# Patient Record
Sex: MALE | Race: WHITE | Employment: FULL TIME | URBAN - METROPOLITAN AREA
[De-identification: names, ages, dates, MRNs, and addresses within clinical notes are randomized per-mention and may not be internally consistent; named-entity substitution may affect disease eponyms.]

---

## 2023-06-21 ENCOUNTER — OFFICE VISIT (OUTPATIENT)
Dept: PRIMARY CARE CLINIC | Age: 58
End: 2023-06-21
Payer: COMMERCIAL

## 2023-06-21 VITALS
DIASTOLIC BLOOD PRESSURE: 80 MMHG | HEIGHT: 70 IN | WEIGHT: 181.8 LBS | HEART RATE: 88 BPM | OXYGEN SATURATION: 98 % | BODY MASS INDEX: 26.03 KG/M2 | SYSTOLIC BLOOD PRESSURE: 120 MMHG

## 2023-06-21 DIAGNOSIS — Z13.6 ENCOUNTER FOR LIPID SCREENING FOR CARDIOVASCULAR DISEASE: ICD-10-CM

## 2023-06-21 DIAGNOSIS — Z12.11 SCREEN FOR COLON CANCER: ICD-10-CM

## 2023-06-21 DIAGNOSIS — R39.12 WEAK URINARY STREAM: ICD-10-CM

## 2023-06-21 DIAGNOSIS — Z13.220 ENCOUNTER FOR LIPID SCREENING FOR CARDIOVASCULAR DISEASE: ICD-10-CM

## 2023-06-21 DIAGNOSIS — R53.82 CHRONIC FATIGUE: ICD-10-CM

## 2023-06-21 DIAGNOSIS — Z00.00 ANNUAL PHYSICAL EXAM: Primary | ICD-10-CM

## 2023-06-21 DIAGNOSIS — Z12.5 SCREENING PSA (PROSTATE SPECIFIC ANTIGEN): ICD-10-CM

## 2023-06-21 PROCEDURE — 99386 PREV VISIT NEW AGE 40-64: CPT | Performed by: PHYSICIAN ASSISTANT

## 2023-06-21 SDOH — ECONOMIC STABILITY: FOOD INSECURITY: WITHIN THE PAST 12 MONTHS, YOU WORRIED THAT YOUR FOOD WOULD RUN OUT BEFORE YOU GOT MONEY TO BUY MORE.: NEVER TRUE

## 2023-06-21 SDOH — ECONOMIC STABILITY: INCOME INSECURITY: HOW HARD IS IT FOR YOU TO PAY FOR THE VERY BASICS LIKE FOOD, HOUSING, MEDICAL CARE, AND HEATING?: NOT HARD AT ALL

## 2023-06-21 SDOH — ECONOMIC STABILITY: FOOD INSECURITY: WITHIN THE PAST 12 MONTHS, THE FOOD YOU BOUGHT JUST DIDN'T LAST AND YOU DIDN'T HAVE MONEY TO GET MORE.: NEVER TRUE

## 2023-06-21 SDOH — ECONOMIC STABILITY: HOUSING INSECURITY
IN THE LAST 12 MONTHS, WAS THERE A TIME WHEN YOU DID NOT HAVE A STEADY PLACE TO SLEEP OR SLEPT IN A SHELTER (INCLUDING NOW)?: NO

## 2023-06-21 ASSESSMENT — ENCOUNTER SYMPTOMS
DIARRHEA: 0
RHINORRHEA: 0
BACK PAIN: 0
CONSTIPATION: 0
EYE PAIN: 0
ABDOMINAL PAIN: 0
VOMITING: 0
NAUSEA: 0
SHORTNESS OF BREATH: 0
COUGH: 0
SINUS PAIN: 0

## 2023-06-21 ASSESSMENT — PATIENT HEALTH QUESTIONNAIRE - PHQ9
SUM OF ALL RESPONSES TO PHQ QUESTIONS 1-9: 0
2. FEELING DOWN, DEPRESSED OR HOPELESS: 0
SUM OF ALL RESPONSES TO PHQ QUESTIONS 1-9: 0
SUM OF ALL RESPONSES TO PHQ QUESTIONS 1-9: 0
1. LITTLE INTEREST OR PLEASURE IN DOING THINGS: 0
SUM OF ALL RESPONSES TO PHQ QUESTIONS 1-9: 0
SUM OF ALL RESPONSES TO PHQ9 QUESTIONS 1 & 2: 0

## 2023-06-21 NOTE — PROGRESS NOTES
[address] [facility] [place] [facility]\"" PRIMARY CARE  [facility] [address] [facility] [address]  [address] [address]  Dept: [phone]  Dept Fax: [phone]    Elke Calvillo is a 59 y.o. male who presents today for his medical conditions/complaints as noted below. Chief Complaint   Patient presents with    New Patient    Annual Exam    Other     Reduced stream, \"differen't sensation,\" no pain       HPI:     Patient presents to the office for annual physical exam and to establish care. No recent PCP. He does not take daily medication for chronic medical illness. He does have past history of smokeless tobacco for 30+ years. He also had recurrent kidney stones when younger. No current urologist.    Patient does mention concern for worsening urinary stream over the last few years. He feels that he is unable to completely empty bladder at times. Decree stream strength. No current medications. No pain. No burning. No blood in the urine. No flank pain. He does mention some issues with fatigue. He does not feel that his diet or lifestyle status change. He is not sure if this has to do with his age. He would like some laboratory studies. Denies any sleep changes. Otherwise, denies any concerns or complaints. He would like to update screening/health maintenance. Please see plan. BP stable. Weight stable.       No results found for: LABA1C          ( goal A1C is < 7)   No results found for: LABMICR  No results found for: LDLCHOLESTEROL, LDLCALC    (goal LDL is <100)   No results found for: AST, ALT, BUN, CR  BP Readings from Last 3 Encounters:   23 120/80   04/10/15 124/86          (goal 120/80)    Past Medical History:   Diagnosis Date    Allergic rhinitis       Past Surgical History:   Procedure Laterality Date    HERNIA REPAIR      with mesh       Family History   Problem Relation Age of Onset    Colon Cancer Mother     Heart Failure Father        Social History     Tobacco Use

## 2023-06-22 ENCOUNTER — HOSPITAL ENCOUNTER (OUTPATIENT)
Age: 58
Setting detail: SPECIMEN
Discharge: HOME | End: 2023-06-22

## 2023-06-22 DIAGNOSIS — R39.12 WEAK URINARY STREAM: ICD-10-CM

## 2023-06-22 LAB
BILIRUB UR QL STRIP: NEGATIVE
CLARITY UR: CLEAR
COLOR UR: YELLOW
COMMENT UA: NORMAL
GLUCOSE UR STRIP-MCNC: NEGATIVE MG/DL
HGB UR QL STRIP.AUTO: NEGATIVE
KETONES UR STRIP-MCNC: NEGATIVE MG/DL
LEUKOCYTE ESTERASE UR QL STRIP: NEGATIVE
NITRITE UR QL STRIP: NEGATIVE
PH UR STRIP: 5.5 [PH] (ref 5–8)
PROT UR STRIP-MCNC: NEGATIVE MG/DL
SP GR UR STRIP: 1.02 (ref 1–1.03)
UROBILINOGEN UR STRIP-ACNC: NORMAL

## 2023-06-23 ENCOUNTER — HOSPITAL ENCOUNTER (OUTPATIENT)
Age: 58
Setting detail: SPECIMEN
Discharge: HOME | End: 2023-06-23

## 2023-06-23 DIAGNOSIS — Z12.5 SCREENING PSA (PROSTATE SPECIFIC ANTIGEN): ICD-10-CM

## 2023-06-23 DIAGNOSIS — R39.12 WEAK URINARY STREAM: ICD-10-CM

## 2023-06-23 DIAGNOSIS — R53.82 CHRONIC FATIGUE: ICD-10-CM

## 2023-06-23 DIAGNOSIS — Z13.6 ENCOUNTER FOR LIPID SCREENING FOR CARDIOVASCULAR DISEASE: ICD-10-CM

## 2023-06-23 DIAGNOSIS — Z13.220 ENCOUNTER FOR LIPID SCREENING FOR CARDIOVASCULAR DISEASE: ICD-10-CM

## 2023-06-23 LAB
25(OH)D3 SERPL-MCNC: 26.7 NG/ML
ALBUMIN SERPL-MCNC: 4.2 G/DL (ref 3.5–5.2)
ALBUMIN/GLOB SERPL: 1.2 {RATIO} (ref 1–2.5)
ALP SERPL-CCNC: 44 U/L (ref 40–129)
ALT SERPL-CCNC: 26 U/L (ref 5–41)
ANION GAP SERPL CALCULATED.3IONS-SCNC: 12 MMOL/L (ref 9–17)
AST SERPL-CCNC: 24 U/L
BILIRUB SERPL-MCNC: 1.1 MG/DL (ref 0.3–1.2)
BUN SERPL-MCNC: 18 MG/DL (ref 8–23)
CALCIUM SERPL-MCNC: 9.7 MG/DL (ref 8.6–10.4)
CHLORIDE SERPL-SCNC: 104 MMOL/L (ref 98–107)
CHOLEST SERPL-MCNC: 181 MG/DL
CHOLESTEROL/HDL RATIO: 5
CO2 SERPL-SCNC: 23 MMOL/L (ref 20–31)
CREAT SERPL-MCNC: 1.03 MG/DL (ref 0.7–1.2)
ERYTHROCYTE [DISTWIDTH] IN BLOOD BY AUTOMATED COUNT: 12.7 % (ref 11.8–14.4)
GFR SERPL CREATININE-BSD FRML MDRD: >60 ML/MIN/1.73M2
GLUCOSE SERPL-MCNC: 87 MG/DL (ref 70–99)
HCT VFR BLD AUTO: 48.7 % (ref 40.7–50.3)
HDLC SERPL-MCNC: 36 MG/DL
HGB BLD-MCNC: 15.8 G/DL (ref 13–17)
LDLC SERPL CALC-MCNC: 122 MG/DL (ref 0–130)
MCH RBC QN AUTO: 32.3 PG (ref 25.2–33.5)
MCHC RBC AUTO-ENTMCNC: 32.4 G/DL (ref 28.4–34.8)
MCV RBC AUTO: 99.6 FL (ref 82.6–102.9)
NRBC AUTOMATED: 0 PER 100 WBC
PLATELET # BLD AUTO: 233 K/UL (ref 138–453)
PMV BLD AUTO: 11.1 FL (ref 8.1–13.5)
POTASSIUM SERPL-SCNC: 4.4 MMOL/L (ref 3.7–5.3)
PROT SERPL-MCNC: 7.7 G/DL (ref 6.4–8.3)
PSA SERPL-MCNC: 1.04 NG/ML
RBC # BLD AUTO: 4.89 M/UL (ref 4.21–5.77)
SHBG SERPL-SCNC: 55 NMOL/L (ref 11–80)
SODIUM SERPL-SCNC: 139 MMOL/L (ref 135–144)
TESTOST FREE MFR SERPL: 148.9 PG/ML (ref 47–244)
TESTOST SERPL-MCNC: 889 NG/DL (ref 220–1000)
TRIGL SERPL-MCNC: 116 MG/DL
TSH SERPL DL<=0.05 MIU/L-ACNC: 1.73 UIU/ML (ref 0.3–5)
WBC OTHER # BLD: 6.7 K/UL (ref 3.5–11.3)

## 2023-06-25 ENCOUNTER — PATIENT MESSAGE (OUTPATIENT)
Dept: PRIMARY CARE CLINIC | Age: 58
End: 2023-06-25

## 2023-06-25 DIAGNOSIS — R39.12 WEAK URINARY STREAM: Primary | ICD-10-CM

## 2023-06-25 DIAGNOSIS — N52.9 ERECTILE DYSFUNCTION, UNSPECIFIED ERECTILE DYSFUNCTION TYPE: ICD-10-CM

## 2023-06-26 RX ORDER — TAMSULOSIN HYDROCHLORIDE 0.4 MG/1
0.4 CAPSULE ORAL DAILY
Qty: 30 CAPSULE | Refills: 0 | Status: SHIPPED | OUTPATIENT
Start: 2023-06-26

## 2023-06-27 RX ORDER — TADALAFIL 5 MG/1
5 TABLET ORAL PRN
Qty: 10 TABLET | Refills: 1 | Status: SHIPPED | OUTPATIENT
Start: 2023-06-27

## 2023-07-03 ENCOUNTER — TELEPHONE (OUTPATIENT)
Dept: SURGERY | Age: 58
End: 2023-07-03

## 2023-07-03 NOTE — TELEPHONE ENCOUNTER
[facility] Surgery  Screening colonoscopy questionnaire  Anselmo Murdock    Pt Name: Susan Razo  MRN: [id number]  YOB: 1965  Primary Care Physician: Dedrick Bowling PA-C      Have you ever had a colonoscopy? No  When was your last colonoscopy? Were any polyps removed or any other significant findings? Have you recently had a stool test to check for colon cancer? No  Was it positive? No    Do you have any family history of colon cancer? No  If yes, which family member had colon cancer? Mother-had intestinal cancer, pt not sure if it was colon cancer. Were they diagnosed younger or older than the age of 61? over age of 60yrs    Do you have a history of Crohn's disease or Ulcerative Colitis? No    Do you have a history of constipation? Yes- Pt has IBS, that has constipation/diarrhea based on diet. Do you have a history of bloody or black stools? No    Have you ever had surgery done inside your abdomen? Yes  What surgery? Hernia Surgery about 20-25 years ago. 8. Are you taking any blood thinners? No   If yes, what is the name of the blood thinner? N/A     Scheduling:     - Spoke to patient, colonoscopy scheduled at [facility], patient confirmed and information mailed to patient. Surgery date/time:  at   Arrival time:   Prep appt:  at     Patient would prefer to schedule on a Monday or Wednesday at [facility] due to wife being off those days to drive.

## 2023-08-02 ENCOUNTER — OFFICE VISIT (OUTPATIENT)
Dept: PRIMARY CARE CLINIC | Age: 58
End: 2023-08-02
Payer: COMMERCIAL

## 2023-08-02 VITALS
HEART RATE: 75 BPM | BODY MASS INDEX: 26.48 KG/M2 | SYSTOLIC BLOOD PRESSURE: 126 MMHG | HEIGHT: 70 IN | OXYGEN SATURATION: 98 % | RESPIRATION RATE: 16 BRPM | DIASTOLIC BLOOD PRESSURE: 74 MMHG | WEIGHT: 185 LBS

## 2023-08-02 DIAGNOSIS — R39.12 WEAK URINARY STREAM: ICD-10-CM

## 2023-08-02 DIAGNOSIS — N52.9 ERECTILE DYSFUNCTION, UNSPECIFIED ERECTILE DYSFUNCTION TYPE: Primary | ICD-10-CM

## 2023-08-02 DIAGNOSIS — R25.2 CALF CRAMP: ICD-10-CM

## 2023-08-02 PROBLEM — J30.9 ALLERGIC RHINITIS: Status: ACTIVE | Noted: 2017-01-10

## 2023-08-02 PROCEDURE — 99214 OFFICE O/P EST MOD 30 MIN: CPT | Performed by: PHYSICIAN ASSISTANT

## 2023-08-02 RX ORDER — TAMSULOSIN HYDROCHLORIDE 0.4 MG/1
0.4 CAPSULE ORAL DAILY
Qty: 30 CAPSULE | Refills: 2 | Status: SHIPPED | OUTPATIENT
Start: 2023-08-02

## 2023-08-02 RX ORDER — SILDENAFIL 50 MG/1
50 TABLET, FILM COATED ORAL PRN
Qty: 10 TABLET | Refills: 0 | Status: SHIPPED | OUTPATIENT
Start: 2023-08-02

## 2023-08-02 RX ORDER — SILDENAFIL 50 MG/1
50 TABLET, FILM COATED ORAL PRN
Qty: 10 TABLET | Refills: 0 | Status: SHIPPED | OUTPATIENT
Start: 2023-08-02 | End: 2023-08-02

## 2023-08-02 ASSESSMENT — ENCOUNTER SYMPTOMS
DIARRHEA: 0
EYE PAIN: 0
ABDOMINAL PAIN: 0
CONSTIPATION: 0
BACK PAIN: 0
RHINORRHEA: 0
NAUSEA: 0
COUGH: 0
SHORTNESS OF BREATH: 0
SINUS PAIN: 0
VOMITING: 0

## 2023-08-02 ASSESSMENT — PATIENT HEALTH QUESTIONNAIRE - PHQ9
1. LITTLE INTEREST OR PLEASURE IN DOING THINGS: 0
SUM OF ALL RESPONSES TO PHQ QUESTIONS 1-9: 0
2. FEELING DOWN, DEPRESSED OR HOPELESS: 0
SUM OF ALL RESPONSES TO PHQ9 QUESTIONS 1 & 2: 0

## 2023-08-02 NOTE — PROGRESS NOTES
[address] [place] [address]  Dept: [phone]  Dept Fax: [phone]    Tobi Sanchez is a 59 y.o. male who presents today for his medical conditions/complaints as noted below. Chief Complaint   Patient presents with    Discuss Labs     Still noticing cramping of calves and sometimes biceps at night or with movement; discuss testosterone levels; Medication Check     Flowmax        HPI:     Patient presents to the office for follow-up on labs and discuss erectile dysfunction. Labs reviewed and are grossly within normal limits. Slight/borderline cholesterol abnormality. We discussed healthy dietary habits and regular physical activity. Patient continues to have concerns for erectile dysfunction and decreased libido. He does not feel Cialis is sufficient for symptoms. He is having difficulty maintaining erection. Additionally, patient is noticing increased urinary frequency and intermittent nighttime urination. He also noticed slight weak stream.  He is agreeable to starting Flomax. Patient is having intermittent nighttime leg cramps. This has been ongoing for several months. He is not sure if it has to do with his keto diet. He is trying to stay hydrated with electrolytes. No numbness or tingling. Denies restless legs. No significant back pain. No other concerns or complaints. BP stable.       No results found for: LABA1C          ( goal A1C is < 7)   No components found for: LABMICR  LDL Cholesterol (mg/dL)   Date Value   06/23/2023 122       (goal LDL is <100)   AST (U/L)   Date Value   06/23/2023 24     ALT (U/L)   Date Value   06/23/2023 26     BUN (mg/dL)   Date Value   06/23/2023 18     BP Readings from Last 3 Encounters:   08/02/23 126/74   06/21/23 120/80   04/10/15 124/86          (goal 120/80)    Past Medical History:   Diagnosis Date    Allergic rhinitis       Past Surgical History:

## 2023-08-15 NOTE — DISCHARGE INSTRUCTIONS

## 2023-08-18 ENCOUNTER — ANESTHESIA EVENT (OUTPATIENT)
Dept: OPERATING ROOM | Age: 58
End: 2023-08-18
Payer: COMMERCIAL

## 2023-08-18 NOTE — PROGRESS NOTES
PAT phone call for colonoscopy completed with pt. Date/time/location (entrance C) of surgery/procedure verified with pt. NPO after MN status verified with pt. Need for  (  x )  verified with pt. Verified need to complete bowel prep/instructions per Dr. Bernette Curling pt to take a shower the AM of surgery/procedure and to avoid lotions, creams, jewelry. Encouraged pt to avoid artificial nails and/or nailpolish.

## 2023-08-21 ENCOUNTER — HOSPITAL ENCOUNTER (OUTPATIENT)
Age: 58
Setting detail: OUTPATIENT SURGERY
Discharge: HOME | End: 2023-08-21
Attending: SURGERY | Admitting: SURGERY
Payer: COMMERCIAL

## 2023-08-21 ENCOUNTER — ANESTHESIA (OUTPATIENT)
Dept: OPERATING ROOM | Age: 58
End: 2023-08-21
Payer: COMMERCIAL

## 2023-08-21 VITALS
RESPIRATION RATE: 17 BRPM | OXYGEN SATURATION: 98 % | BODY MASS INDEX: 25.62 KG/M2 | SYSTOLIC BLOOD PRESSURE: 121 MMHG | TEMPERATURE: 97.3 F | HEART RATE: 66 BPM | DIASTOLIC BLOOD PRESSURE: 99 MMHG | HEIGHT: 70 IN | WEIGHT: 179 LBS

## 2023-08-21 DIAGNOSIS — Z12.11 SCREEN FOR COLON CANCER: ICD-10-CM

## 2023-08-21 PROCEDURE — 3700000001 HC ADD 15 MINUTES (ANESTHESIA): Performed by: SURGERY

## 2023-08-21 PROCEDURE — 6360000002 HC RX W HCPCS: Performed by: NURSE ANESTHETIST, CERTIFIED REGISTERED

## 2023-08-21 PROCEDURE — 2709999900 HC NON-CHARGEABLE SUPPLY: Performed by: SURGERY

## 2023-08-21 PROCEDURE — 7100000010 HC PHASE II RECOVERY - FIRST 15 MIN: Performed by: SURGERY

## 2023-08-21 PROCEDURE — 7100000011 HC PHASE II RECOVERY - ADDTL 15 MIN: Performed by: SURGERY

## 2023-08-21 PROCEDURE — 3609010300 HC COLONOSCOPY W/BIOPSY SINGLE/MULTIPLE: Performed by: SURGERY

## 2023-08-21 PROCEDURE — 88305 TISSUE EXAM BY PATHOLOGIST: CPT

## 2023-08-21 PROCEDURE — 2500000003 HC RX 250 WO HCPCS: Performed by: NURSE ANESTHETIST, CERTIFIED REGISTERED

## 2023-08-21 PROCEDURE — 2720000010 HC SURG SUPPLY STERILE: Performed by: SURGERY

## 2023-08-21 PROCEDURE — 2580000003 HC RX 258: Performed by: ANESTHESIOLOGY

## 2023-08-21 PROCEDURE — 3700000000 HC ANESTHESIA ATTENDED CARE: Performed by: SURGERY

## 2023-08-21 PROCEDURE — 45385 COLONOSCOPY W/LESION REMOVAL: CPT | Performed by: SURGERY

## 2023-08-21 RX ORDER — SODIUM CHLORIDE, SODIUM LACTATE, POTASSIUM CHLORIDE, CALCIUM CHLORIDE 600; 310; 30; 20 MG/100ML; MG/100ML; MG/100ML; MG/100ML
INJECTION, SOLUTION INTRAVENOUS CONTINUOUS
Status: DISCONTINUED | OUTPATIENT
Start: 2023-08-21 | End: 2023-08-21 | Stop reason: HOSPADM

## 2023-08-21 RX ORDER — SODIUM CHLORIDE 0.9 % (FLUSH) 0.9 %
5-40 SYRINGE (ML) INJECTION PRN
Status: DISCONTINUED | OUTPATIENT
Start: 2023-08-21 | End: 2023-08-21 | Stop reason: HOSPADM

## 2023-08-21 RX ORDER — LIDOCAINE HYDROCHLORIDE 10 MG/ML
INJECTION, SOLUTION EPIDURAL; INFILTRATION; INTRACAUDAL; PERINEURAL PRN
Status: DISCONTINUED | OUTPATIENT
Start: 2023-08-21 | End: 2023-08-21 | Stop reason: SDUPTHER

## 2023-08-21 RX ORDER — OXYCODONE HYDROCHLORIDE AND ACETAMINOPHEN 5; 325 MG/1; MG/1
2 TABLET ORAL
Status: DISCONTINUED | OUTPATIENT
Start: 2023-08-21 | End: 2023-08-21 | Stop reason: HOSPADM

## 2023-08-21 RX ORDER — MORPHINE SULFATE 2 MG/ML
2 INJECTION, SOLUTION INTRAMUSCULAR; INTRAVENOUS EVERY 5 MIN PRN
Status: DISCONTINUED | OUTPATIENT
Start: 2023-08-21 | End: 2023-08-21 | Stop reason: HOSPADM

## 2023-08-21 RX ORDER — ONDANSETRON 2 MG/ML
4 INJECTION INTRAMUSCULAR; INTRAVENOUS
Status: DISCONTINUED | OUTPATIENT
Start: 2023-08-21 | End: 2023-08-21 | Stop reason: HOSPADM

## 2023-08-21 RX ORDER — SODIUM CHLORIDE 0.9 % (FLUSH) 0.9 %
5-40 SYRINGE (ML) INJECTION EVERY 12 HOURS SCHEDULED
Status: DISCONTINUED | OUTPATIENT
Start: 2023-08-21 | End: 2023-08-21 | Stop reason: HOSPADM

## 2023-08-21 RX ORDER — DIPHENHYDRAMINE HYDROCHLORIDE 50 MG/ML
12.5 INJECTION INTRAMUSCULAR; INTRAVENOUS
Status: DISCONTINUED | OUTPATIENT
Start: 2023-08-21 | End: 2023-08-21 | Stop reason: HOSPADM

## 2023-08-21 RX ORDER — LABETALOL HYDROCHLORIDE 5 MG/ML
10 INJECTION, SOLUTION INTRAVENOUS
Status: DISCONTINUED | OUTPATIENT
Start: 2023-08-21 | End: 2023-08-21 | Stop reason: HOSPADM

## 2023-08-21 RX ORDER — METOCLOPRAMIDE HYDROCHLORIDE 5 MG/ML
10 INJECTION INTRAMUSCULAR; INTRAVENOUS
Status: DISCONTINUED | OUTPATIENT
Start: 2023-08-21 | End: 2023-08-21 | Stop reason: HOSPADM

## 2023-08-21 RX ORDER — PROPOFOL 10 MG/ML
INJECTION, EMULSION INTRAVENOUS CONTINUOUS PRN
Status: DISCONTINUED | OUTPATIENT
Start: 2023-08-21 | End: 2023-08-21 | Stop reason: SDUPTHER

## 2023-08-21 RX ORDER — HYDRALAZINE HYDROCHLORIDE 20 MG/ML
10 INJECTION INTRAMUSCULAR; INTRAVENOUS
Status: DISCONTINUED | OUTPATIENT
Start: 2023-08-21 | End: 2023-08-21 | Stop reason: HOSPADM

## 2023-08-21 RX ORDER — SODIUM CHLORIDE 9 MG/ML
INJECTION, SOLUTION INTRAVENOUS PRN
Status: DISCONTINUED | OUTPATIENT
Start: 2023-08-21 | End: 2023-08-21 | Stop reason: HOSPADM

## 2023-08-21 RX ORDER — PROMETHAZINE HYDROCHLORIDE 25 MG/ML
6.25 INJECTION, SOLUTION INTRAMUSCULAR; INTRAVENOUS EVERY 5 MIN PRN
Status: DISCONTINUED | OUTPATIENT
Start: 2023-08-21 | End: 2023-08-21 | Stop reason: HOSPADM

## 2023-08-21 RX ORDER — GLYCOPYRROLATE 0.2 MG/ML
0.4 INJECTION INTRAMUSCULAR; INTRAVENOUS ONCE
Status: DISCONTINUED | OUTPATIENT
Start: 2023-08-21 | End: 2023-08-21 | Stop reason: HOSPADM

## 2023-08-21 RX ORDER — OXYCODONE HYDROCHLORIDE AND ACETAMINOPHEN 5; 325 MG/1; MG/1
1 TABLET ORAL
Status: DISCONTINUED | OUTPATIENT
Start: 2023-08-21 | End: 2023-08-21 | Stop reason: HOSPADM

## 2023-08-21 RX ORDER — MIDAZOLAM HYDROCHLORIDE 2 MG/2ML
2 INJECTION, SOLUTION INTRAMUSCULAR; INTRAVENOUS
Status: DISCONTINUED | OUTPATIENT
Start: 2023-08-21 | End: 2023-08-21 | Stop reason: HOSPADM

## 2023-08-21 RX ORDER — MEPERIDINE HYDROCHLORIDE 50 MG/ML
12.5 INJECTION INTRAMUSCULAR; INTRAVENOUS; SUBCUTANEOUS EVERY 5 MIN PRN
Status: DISCONTINUED | OUTPATIENT
Start: 2023-08-21 | End: 2023-08-21 | Stop reason: HOSPADM

## 2023-08-21 RX ORDER — IPRATROPIUM BROMIDE AND ALBUTEROL SULFATE 2.5; .5 MG/3ML; MG/3ML
1 SOLUTION RESPIRATORY (INHALATION)
Status: DISCONTINUED | OUTPATIENT
Start: 2023-08-21 | End: 2023-08-21 | Stop reason: HOSPADM

## 2023-08-21 RX ADMIN — SODIUM CHLORIDE, POTASSIUM CHLORIDE, SODIUM LACTATE AND CALCIUM CHLORIDE: 600; 310; 30; 20 INJECTION, SOLUTION INTRAVENOUS at 10:23

## 2023-08-21 RX ADMIN — LIDOCAINE HYDROCHLORIDE 50 MG: 10 INJECTION, SOLUTION EPIDURAL; INFILTRATION; INTRACAUDAL; PERINEURAL at 10:24

## 2023-08-21 RX ADMIN — PROPOFOL 150 MCG/KG/MIN: 10 INJECTION, EMULSION INTRAVENOUS at 10:23

## 2023-08-21 NOTE — H&P
[facility]      Patient's Name/ Date of Birth/ Gender: Rayna Amor / 1965 (59 y.o.) / male     Attending physician: Luis E Echevarria DO    CC: colorectal cancer screening    History of present Illness: Rayna Amor is a 59 y.o. male, presents today for colonoscopy for:    Active Hospital Problems    Diagnosis Date Noted    Colon cancer screening [Z12.11] 08/21/2023         Colonoscopy history: No prior cscope. Past Medical History:  has a past medical history of Allergic rhinitis. Past Surgical History:   Past Surgical History:   Procedure Laterality Date    HERNIA REPAIR      with mesh       Social History:  reports that he quit smoking about 39 years ago. His smoking use included cigarettes. He has a 4.00 pack-year smoking history. He has never been exposed to tobacco smoke. He quit smokeless tobacco use about 3 months ago. His smokeless tobacco use included chew. He reports that he does not currently use alcohol. He reports that he does not use drugs. Family History: family history includes Colon Cancer in his mother; Heart Failure in his father. Review of Systems:   General: Denies fever, chills, night sweats, weight loss, malaise, fatigue  HEENT: Denies sore throat, sinus problems, allergic rhinosinusitis  Card: Denies chest pain, palpitations, orthopnea/PND. Denies h/o murmurs  Pulm: Denies cough, shortness of breath, MINA  GI:  per HPI; denies history of constipation, diarrhea, hematochezia or melena  : Denies polyuria, dysuria, hematuria  Endo: Denies diabetes, thyroid problems. Heme: Denies anemia, h/o bleeding or clotting problems. Neuro: Denies h/o CVA, TIA  Skin: Denies rashes, ulcers  Musculoskeletal: Denies muscle, joint, back pain. Allergies: Patient has no known allergies.     Current Meds:  Current Facility-Administered Medications:     lactated ringers IV soln infusion, , IntraVENous, Continuous, Chad Moran MD    sodium

## 2023-08-21 NOTE — ANESTHESIA POSTPROCEDURE EVALUATION
Department of Anesthesiology  Postprocedure Note    Patient: Barbara Rajput  MRN: [id number]  YOB: 1965  Date of evaluation: 8/21/2023      Procedure Summary     Date: 08/21/23 Room / Location: [address] / [facility]    Anesthesia Start: 3042 Anesthesia Stop: 4777    Procedure: COLONOSCOPY WITH BIOPSY Diagnosis:       Screen for colon cancer      (Screen for colon cancer [Z12.11])    Surgeons: Antonietta Gallardo DO Responsible Provider: Sera Gooden MD    Anesthesia Type: MAC ASA Status: 1          Anesthesia Type: No value filed.     Alexandre Phase I: Alexandre Score: 10    Alexandre Phase II: Alexandre Score: 8      Anesthesia Post Evaluation    Patient location during evaluation: PACU  Patient participation: complete - patient participated  Level of consciousness: awake and alert  Airway patency: patent  Nausea & Vomiting: no nausea and no vomiting  Complications: no  Cardiovascular status: hemodynamically stable  Respiratory status: room air and spontaneous ventilation  Hydration status: euvolemic  Multimodal analgesia pain management approach  Pain management: adequate

## 2023-08-21 NOTE — OP NOTE
sigmoid colon and rectum. The scope was withdrawn to the rectal vault and then retroflexed. The scope was then straightened and removed. The patient tolerated the procedure well and was transferred to the recovery unit in stable condition. Findings:    Polyps: removed with hot snare  Ascending polyp 6mm  Sigmoid polyp 6mm, base was clipped with resolution clip for hemostasis      Other findings:   internal hemorrhoids and pandiverticulosis        Greater than 9 minutes was spent withdrawing the colonoscope.       IMPRESSION/PLAN:   Increase dietary fiber and water  Patient is advised to have a repeat colonoscopy in 5-7 years unless otherwise dictated by pathology  Colonoscopy sooner if blood in the stool, unexplained weight loss, or change in the bowels        Electronically signed by Salima Medina DO on 8/21/2023 at 11:29 AM

## 2023-08-22 LAB — SURGICAL PATHOLOGY REPORT: NORMAL

## 2023-09-20 PROBLEM — Z12.11 COLON CANCER SCREENING: Status: RESOLVED | Noted: 2023-08-21 | Resolved: 2023-09-20

## 2023-11-21 ENCOUNTER — OFFICE VISIT (OUTPATIENT)
Dept: PRIMARY CARE CLINIC | Age: 58
End: 2023-11-21
Payer: COMMERCIAL

## 2023-11-21 VITALS
DIASTOLIC BLOOD PRESSURE: 72 MMHG | BODY MASS INDEX: 26.43 KG/M2 | RESPIRATION RATE: 16 BRPM | HEART RATE: 97 BPM | SYSTOLIC BLOOD PRESSURE: 116 MMHG | OXYGEN SATURATION: 97 % | HEIGHT: 70 IN | WEIGHT: 184.6 LBS

## 2023-11-21 DIAGNOSIS — N52.9 ERECTILE DYSFUNCTION, UNSPECIFIED ERECTILE DYSFUNCTION TYPE: ICD-10-CM

## 2023-11-21 DIAGNOSIS — R39.12 WEAK URINARY STREAM: Primary | ICD-10-CM

## 2023-11-21 PROCEDURE — 99213 OFFICE O/P EST LOW 20 MIN: CPT | Performed by: PHYSICIAN ASSISTANT

## 2023-11-21 ASSESSMENT — PATIENT HEALTH QUESTIONNAIRE - PHQ9
SUM OF ALL RESPONSES TO PHQ QUESTIONS 1-9: 2
1. LITTLE INTEREST OR PLEASURE IN DOING THINGS: 1
SUM OF ALL RESPONSES TO PHQ QUESTIONS 1-9: 2
SUM OF ALL RESPONSES TO PHQ9 QUESTIONS 1 & 2: 2
SUM OF ALL RESPONSES TO PHQ QUESTIONS 1-9: 2
SUM OF ALL RESPONSES TO PHQ QUESTIONS 1-9: 2
2. FEELING DOWN, DEPRESSED OR HOPELESS: 1

## 2023-11-21 NOTE — PROGRESS NOTES
[address]  PRIMARY CARE  [facility] [address] [address] [address]  Dept: [phone]  Dept Fax: [phone]    Arley Walters is a 59 y.o. male who presents today for his medical conditions/complaints as noted below. Chief Complaint   Patient presents with    Benign Prostatic Hypertrophy     Medication check        HPI:     Patient presents to the office for follow-up on BPH/medication. Patient reports he stopped taking Flomax. He did not feel it helped enough to warrant taking medication. He would like to stay off medications if possible. BPH type symptoms such as weak stream and frequency are nonbothersome and he feels he can manage them without assistance of medication. He reports ED is not significantly limiting. He is still able to have sexual relations with wife. He is not taking any more Viagra. No other changes or concerns. Patient is staying physically active and trying to eat healthier nutrition with avoidance of processed carbohydrates. Denies any lightheadedness, dizziness, shortness of breath, chest pain, leg edema, syncope. BP stable. Weight stable.         No results found for: \"LABA1C\"          ( goal A1C is < 7)   No components found for: \"LABMICR\"  LDL Cholesterol (mg/dL)   Date Value   06/23/2023 122       (goal LDL is <100)   AST (U/L)   Date Value   06/23/2023 24     ALT (U/L)   Date Value   06/23/2023 26     BUN (mg/dL)   Date Value   06/23/2023 18     BP Readings from Last 3 Encounters:   11/21/23 116/72   08/21/23 (!) 121/99   08/02/23 126/74          (goal 120/80)    Past Medical History:   Diagnosis Date    Allergic rhinitis       Past Surgical History:   Procedure Laterality Date    COLONOSCOPY  08/21/2023    COLONOSCOPY N/A 8/21/2023    COLONOSCOPY WITH BIOPSY performed by Humza Hand DO at [address]      with mesh       Family History   Problem Relation Age of Onset    Colon Cancer Mother

## 2023-11-22 ASSESSMENT — ENCOUNTER SYMPTOMS
SINUS PAIN: 0
COUGH: 0
ABDOMINAL PAIN: 0
DIARRHEA: 0
CONSTIPATION: 0
RHINORRHEA: 0
SHORTNESS OF BREATH: 0
EYE PAIN: 0
VOMITING: 0
BACK PAIN: 0
NAUSEA: 0

## 2024-01-14 ASSESSMENT — PATIENT HEALTH QUESTIONNAIRE - PHQ9
1. LITTLE INTEREST OR PLEASURE IN DOING THINGS: 3
8. MOVING OR SPEAKING SO SLOWLY THAT OTHER PEOPLE COULD HAVE NOTICED. OR THE OPPOSITE - BEING SO FIDGETY OR RESTLESS THAT YOU HAVE BEEN MOVING AROUND A LOT MORE THAN USUAL: SEVERAL DAYS
8. MOVING OR SPEAKING SO SLOWLY THAT OTHER PEOPLE COULD HAVE NOTICED. OR THE OPPOSITE, BEING SO FIGETY OR RESTLESS THAT YOU HAVE BEEN MOVING AROUND A LOT MORE THAN USUAL: 1
10. IF YOU CHECKED OFF ANY PROBLEMS, HOW DIFFICULT HAVE THESE PROBLEMS MADE IT FOR YOU TO DO YOUR WORK, TAKE CARE OF THINGS AT HOME, OR GET ALONG WITH OTHER PEOPLE: 1
1. LITTLE INTEREST OR PLEASURE IN DOING THINGS: NEARLY EVERY DAY
10. IF YOU CHECKED OFF ANY PROBLEMS, HOW DIFFICULT HAVE THESE PROBLEMS MADE IT FOR YOU TO DO YOUR WORK, TAKE CARE OF THINGS AT HOME, OR GET ALONG WITH OTHER PEOPLE: SOMEWHAT DIFFICULT
5. POOR APPETITE OR OVEREATING: MORE THAN HALF THE DAYS
2. FEELING DOWN, DEPRESSED OR HOPELESS: NEARLY EVERY DAY
3. TROUBLE FALLING OR STAYING ASLEEP: 1
SUM OF ALL RESPONSES TO PHQ QUESTIONS 1-9: 16
4. FEELING TIRED OR HAVING LITTLE ENERGY: 1
SUM OF ALL RESPONSES TO PHQ QUESTIONS 1-9: 16
9. THOUGHTS THAT YOU WOULD BE BETTER OFF DEAD, OR OF HURTING YOURSELF: SEVERAL DAYS
6. FEELING BAD ABOUT YOURSELF - OR THAT YOU ARE A FAILURE OR HAVE LET YOURSELF OR YOUR FAMILY DOWN: MORE THAN HALF THE DAYS
7. TROUBLE CONCENTRATING ON THINGS, SUCH AS READING THE NEWSPAPER OR WATCHING TELEVISION: MORE THAN HALF THE DAYS
SUM OF ALL RESPONSES TO PHQ QUESTIONS 1-9: 16
5. POOR APPETITE OR OVEREATING: 2
2. FEELING DOWN, DEPRESSED OR HOPELESS: 3
SUM OF ALL RESPONSES TO PHQ9 QUESTIONS 1 & 2: 6
SUM OF ALL RESPONSES TO PHQ QUESTIONS 1-9: 16
3. TROUBLE FALLING OR STAYING ASLEEP: SEVERAL DAYS
6. FEELING BAD ABOUT YOURSELF - OR THAT YOU ARE A FAILURE OR HAVE LET YOURSELF OR YOUR FAMILY DOWN: 2
7. TROUBLE CONCENTRATING ON THINGS, SUCH AS READING THE NEWSPAPER OR WATCHING TELEVISION: 2
4. FEELING TIRED OR HAVING LITTLE ENERGY: SEVERAL DAYS
9. THOUGHTS THAT YOU WOULD BE BETTER OFF DEAD, OR OF HURTING YOURSELF: 1
SUM OF ALL RESPONSES TO PHQ QUESTIONS 1-9: 15
SUM OF ALL RESPONSES TO PHQ9 QUESTIONS 1 & 2: 6

## 2024-01-14 ASSESSMENT — COLUMBIA-SUICIDE SEVERITY RATING SCALE - C-SSRS
6. IN YOUR LIFETIME, HAVE YOU EVER DONE ANYTHING, STARTED TO DO ANYTHING, OR PREPARED TO DO ANYTHING TO END YOUR LIFE?: NO
1. IN THE PAST MONTH, HAVE YOU WISHED YOU WERE DEAD OR WISHED YOU COULD GO TO SLEEP AND NOT WAKE UP?: YES
2. IN THE PAST MONTH, HAVE YOU ACTUALLY HAD ANY THOUGHTS OF KILLING YOURSELF?: NO

## 2024-01-15 ENCOUNTER — OFFICE VISIT (OUTPATIENT)
Dept: PRIMARY CARE CLINIC | Age: 59
End: 2024-01-15
Payer: COMMERCIAL

## 2024-01-15 VITALS
DIASTOLIC BLOOD PRESSURE: 84 MMHG | OXYGEN SATURATION: 98 % | WEIGHT: 180.6 LBS | HEART RATE: 67 BPM | RESPIRATION RATE: 16 BRPM | BODY MASS INDEX: 25.86 KG/M2 | SYSTOLIC BLOOD PRESSURE: 120 MMHG | HEIGHT: 70 IN

## 2024-01-15 DIAGNOSIS — F41.8 MIXED ANXIETY AND DEPRESSIVE DISORDER: Primary | ICD-10-CM

## 2024-01-15 PROBLEM — F41.9 ANXIETY DISORDER: Status: ACTIVE | Noted: 2021-04-22

## 2024-01-15 PROBLEM — F33.2 SEVERE EPISODE OF RECURRENT MAJOR DEPRESSIVE DISORDER, WITHOUT PSYCHOTIC FEATURES (HCC): Status: ACTIVE | Noted: 2024-01-15

## 2024-01-15 PROCEDURE — 99214 OFFICE O/P EST MOD 30 MIN: CPT | Performed by: PHYSICIAN ASSISTANT

## 2024-01-15 RX ORDER — ESCITALOPRAM OXALATE 10 MG/1
10 TABLET ORAL DAILY
Qty: 30 TABLET | Refills: 2 | Status: SHIPPED | OUTPATIENT
Start: 2024-01-15

## 2024-01-15 NOTE — PROGRESS NOTES
[facility]  [facility] PRIMARY CARE  [address] [place] [address] [address]  [address]  [facility] [address]  Dept: [phone]  Dept Fax: [phone]    Ignacio Yuan is a 58 y.o. male who presents today for his medical conditions/complaints as noted below.    Chief Complaint   Patient presents with    Anxiety     X 2.5 years, having issues recently, was previously on Lexapro and doing counseling with good results; patient is in counseling currently       HPI:     Patient presents the office for discussion on anxiety and depression.  He reports flareup of mental health the last couple months, over this is a chronic issue.  It was at its height a few years ago after his third marriage and divorce.  He reports his wife at that time left him.  At this time he is on to his fourth marriage and according to him this went pretty quickly.  There have been some new things that have started to cause some anxiety including cats living around the house and grandkids.  He describes frequent anxiety, constant worrying.  There are occasionally racing thoughts at nighttime.  He does admit to down mood and lack of interest in activities.  According to patient he is going to counseling and working through the [employer] to keep a positive mood.  He denies any active thoughts of suicide or harming others.  Patient reports in the past used to cope with anxiety with alcohol, however he is not drinking any at this time.  He has been on medications in the past.  He reports Lexapro worked the best for him.  Denies any history of side effect.    Denies any other concerns or complaints.  BP stable.        No results found for: \"LABA1C\"          ( goal A1C is < 7)   No components found for: \"LABMICR\"  LDL Cholesterol (mg/dL)   Date Value   06/23/2023 122       (goal LDL is <100)   AST (U/L)   Date Value   06/23/2023 24     ALT (U/L)   Date Value   06/23/2023 26     BUN (mg/dL)   Date Value   06/23/2023 18     BP Readings from Last 3

## 2024-01-16 ASSESSMENT — ENCOUNTER SYMPTOMS
CONSTIPATION: 0
EYE PAIN: 0
RHINORRHEA: 0
NAUSEA: 0
DIARRHEA: 0
BACK PAIN: 0
SINUS PAIN: 0
COUGH: 0
SHORTNESS OF BREATH: 0
VOMITING: 0
ABDOMINAL PAIN: 0

## 2024-02-15 ENCOUNTER — OFFICE VISIT (OUTPATIENT)
Dept: PRIMARY CARE CLINIC | Age: 59
End: 2024-02-15
Payer: COMMERCIAL

## 2024-02-15 VITALS
BODY MASS INDEX: 25 KG/M2 | OXYGEN SATURATION: 97 % | DIASTOLIC BLOOD PRESSURE: 84 MMHG | WEIGHT: 174.6 LBS | SYSTOLIC BLOOD PRESSURE: 134 MMHG | HEIGHT: 70 IN | RESPIRATION RATE: 16 BRPM | HEART RATE: 70 BPM

## 2024-02-15 DIAGNOSIS — F41.8 MIXED ANXIETY AND DEPRESSIVE DISORDER: ICD-10-CM

## 2024-02-15 PROCEDURE — 99213 OFFICE O/P EST LOW 20 MIN: CPT | Performed by: PHYSICIAN ASSISTANT

## 2024-02-15 RX ORDER — ESCITALOPRAM OXALATE 10 MG/1
10 TABLET ORAL DAILY
Qty: 90 TABLET | Refills: 1 | Status: SHIPPED | OUTPATIENT
Start: 2024-02-15 | End: 2024-02-23 | Stop reason: ALTCHOICE

## 2024-02-15 ASSESSMENT — PATIENT HEALTH QUESTIONNAIRE - PHQ9
7. TROUBLE CONCENTRATING ON THINGS, SUCH AS READING THE NEWSPAPER OR WATCHING TELEVISION: 0
1. LITTLE INTEREST OR PLEASURE IN DOING THINGS: 0
3. TROUBLE FALLING OR STAYING ASLEEP: 1
SUM OF ALL RESPONSES TO PHQ QUESTIONS 1-9: 3
5. POOR APPETITE OR OVEREATING: 1
SUM OF ALL RESPONSES TO PHQ QUESTIONS 1-9: 3
2. FEELING DOWN, DEPRESSED OR HOPELESS: 0
4. FEELING TIRED OR HAVING LITTLE ENERGY: 1
6. FEELING BAD ABOUT YOURSELF - OR THAT YOU ARE A FAILURE OR HAVE LET YOURSELF OR YOUR FAMILY DOWN: 0
8. MOVING OR SPEAKING SO SLOWLY THAT OTHER PEOPLE COULD HAVE NOTICED. OR THE OPPOSITE, BEING SO FIGETY OR RESTLESS THAT YOU HAVE BEEN MOVING AROUND A LOT MORE THAN USUAL: 0
10. IF YOU CHECKED OFF ANY PROBLEMS, HOW DIFFICULT HAVE THESE PROBLEMS MADE IT FOR YOU TO DO YOUR WORK, TAKE CARE OF THINGS AT HOME, OR GET ALONG WITH OTHER PEOPLE: 1
SUM OF ALL RESPONSES TO PHQ QUESTIONS 1-9: 3
SUM OF ALL RESPONSES TO PHQ QUESTIONS 1-9: 3
9. THOUGHTS THAT YOU WOULD BE BETTER OFF DEAD, OR OF HURTING YOURSELF: 0
SUM OF ALL RESPONSES TO PHQ9 QUESTIONS 1 & 2: 0

## 2024-02-15 NOTE — PROGRESS NOTES
[facility] PHYSICIANS  [facility] PRIMARY CARE  [address] [place] [address] [address]  [address]  [facility] [address]  Dept: [phone]  Dept Fax: [phone]    Ignacio Yuan is a 58 y.o. male who presents today for his medical conditions/complaints as noted below.    Chief Complaint   Patient presents with    Anxiety    Depression       HPI:     Patient presents the office for follow-up on anxiety depression.  At last office visit he was started on Lexapro.  He gradually titrated and is now taking 10 mg daily.  He describes gradual improvement anxiety.  He is feeling less on edge/anxious.  Not worrying as much.  His mood is more positive, although he still does have some down days.  Sleep is still hit or miss.  Sometimes he is having dreams that keep him up.  He continues to follow with counseling.  Overall he does feel the medication is helping.  Denies any side effect to Lexapro.  He would like to continue current dose.    No other acute changes or concerns.  BP stable.        No results found for: \"LABA1C\"          ( goal A1C is < 7)   No components found for: \"LABMICR\"  LDL Cholesterol (mg/dL)   Date Value   06/23/2023 122       (goal LDL is <100)   AST (U/L)   Date Value   06/23/2023 24     ALT (U/L)   Date Value   06/23/2023 26     BUN (mg/dL)   Date Value   06/23/2023 18     BP Readings from Last 3 Encounters:   02/15/24 134/84   01/15/24 120/84   11/21/23 116/72          (goal 120/80)    Past Medical History:   Diagnosis Date    Allergic rhinitis     Erectile dysfunction 2007    Irritable bowel syndrome 2015      Past Surgical History:   Procedure Laterality Date    COLONOSCOPY  08/21/2023    COLONOSCOPY N/A 8/21/2023    COLONOSCOPY WITH BIOPSY performed by Jonathan Taveras DO at [facility] OR    HERNIA REPAIR      with mesh       Family History   Problem Relation Age of Onset    Colon Cancer Mother     Heart Failure Father        Social History     Tobacco Use    Smoking status: Former     Current

## 2024-02-22 ENCOUNTER — TELEPHONE (OUTPATIENT)
Dept: PRIMARY CARE CLINIC | Age: 59
End: 2024-02-22

## 2024-02-22 DIAGNOSIS — F41.8 MIXED ANXIETY AND DEPRESSIVE DISORDER: Primary | ICD-10-CM

## 2024-02-22 RX ORDER — HYDROXYZINE 50 MG/1
50 TABLET, FILM COATED ORAL EVERY 8 HOURS PRN
Qty: 30 TABLET | Refills: 0 | Status: SHIPPED | OUTPATIENT
Start: 2024-02-22 | End: 2024-03-23

## 2024-02-22 NOTE — TELEPHONE ENCOUNTER
Pt called in stated that he is having Panic attacks , not eating not drinking , haven't been to work in 2 days due to sweating and Panic attacks . Pt stated that he started Lexapro 10 mg 5 weeks ago reaction started 7-10 days feels as it just getting worse . Pt would like to see if there is something different or adjust the medication .     Last Visit Date: 2/15/2024   Next Visit Date: 5/16/2024

## 2024-02-22 NOTE — TELEPHONE ENCOUNTER
I can send hydroxyzine to pharmacy.   We can discuss adjusting medication/alternative.  See if he can add virtual visit or in person tomorrow.

## 2024-02-22 NOTE — TELEPHONE ENCOUNTER
Pt made appt for tomorrow with you. He said the hydroxyzine is fine. He is asking if he stops the lexapro and start that or takes both ? Please advise and send to [facility] in [place].

## 2024-02-22 NOTE — TELEPHONE ENCOUNTER
He can start hydroxyzine and use as needed for anxiety.  As far as Lexapro goes, we will likely select alternative medication tomorrow.  If any acute worsening/panic/depression/thoughts of harm recommend GEOFF Mojica

## 2024-02-23 ENCOUNTER — OFFICE VISIT (OUTPATIENT)
Dept: PRIMARY CARE CLINIC | Age: 59
End: 2024-02-23
Payer: COMMERCIAL

## 2024-02-23 VITALS
BODY MASS INDEX: 25.25 KG/M2 | HEART RATE: 98 BPM | HEIGHT: 70 IN | RESPIRATION RATE: 16 BRPM | DIASTOLIC BLOOD PRESSURE: 86 MMHG | WEIGHT: 176.4 LBS | SYSTOLIC BLOOD PRESSURE: 124 MMHG | OXYGEN SATURATION: 97 %

## 2024-02-23 DIAGNOSIS — F41.8 MIXED ANXIETY AND DEPRESSIVE DISORDER: Primary | ICD-10-CM

## 2024-02-23 PROCEDURE — 99214 OFFICE O/P EST MOD 30 MIN: CPT | Performed by: PHYSICIAN ASSISTANT

## 2024-02-23 RX ORDER — BUPROPION HYDROCHLORIDE 150 MG/1
150 TABLET ORAL EVERY MORNING
Qty: 30 TABLET | Refills: 0 | Status: SHIPPED | OUTPATIENT
Start: 2024-02-23

## 2024-02-23 ASSESSMENT — ENCOUNTER SYMPTOMS
SINUS PAIN: 0
BACK PAIN: 0
EYE PAIN: 0
NAUSEA: 0
VOMITING: 0
ABDOMINAL PAIN: 0
DIARRHEA: 0
SHORTNESS OF BREATH: 0
RHINORRHEA: 0
CONSTIPATION: 0
COUGH: 0

## 2024-02-23 ASSESSMENT — PATIENT HEALTH QUESTIONNAIRE - PHQ9
8. MOVING OR SPEAKING SO SLOWLY THAT OTHER PEOPLE COULD HAVE NOTICED. OR THE OPPOSITE, BEING SO FIGETY OR RESTLESS THAT YOU HAVE BEEN MOVING AROUND A LOT MORE THAN USUAL: 1
6. FEELING BAD ABOUT YOURSELF - OR THAT YOU ARE A FAILURE OR HAVE LET YOURSELF OR YOUR FAMILY DOWN: 0
SUM OF ALL RESPONSES TO PHQ QUESTIONS 1-9: 4
SUM OF ALL RESPONSES TO PHQ QUESTIONS 1-9: 4
1. LITTLE INTEREST OR PLEASURE IN DOING THINGS: 0
10. IF YOU CHECKED OFF ANY PROBLEMS, HOW DIFFICULT HAVE THESE PROBLEMS MADE IT FOR YOU TO DO YOUR WORK, TAKE CARE OF THINGS AT HOME, OR GET ALONG WITH OTHER PEOPLE: 1
2. FEELING DOWN, DEPRESSED OR HOPELESS: 0
SUM OF ALL RESPONSES TO PHQ9 QUESTIONS 1 & 2: 0
9. THOUGHTS THAT YOU WOULD BE BETTER OFF DEAD, OR OF HURTING YOURSELF: 0
5. POOR APPETITE OR OVEREATING: 3
3. TROUBLE FALLING OR STAYING ASLEEP: 0
SUM OF ALL RESPONSES TO PHQ QUESTIONS 1-9: 4
7. TROUBLE CONCENTRATING ON THINGS, SUCH AS READING THE NEWSPAPER OR WATCHING TELEVISION: 0
4. FEELING TIRED OR HAVING LITTLE ENERGY: 0
SUM OF ALL RESPONSES TO PHQ QUESTIONS 1-9: 4

## 2024-02-23 NOTE — PROGRESS NOTES
place, and time.   Psychiatric:         Mood and Affect: Mood normal.         Behavior: Behavior normal.       /86 (Site: Left Upper Arm, Position: Sitting, Cuff Size: Medium Adult)   Pulse 98   Resp 16   Ht 1.778 m (5' 10\")   Wt 80 kg (176 lb 6.4 oz)   SpO2 97%   BMI 25.31 kg/m²     Assessment:       ICD-10-CM    1. Mixed anxiety and depressive disorder  F41.8 buPROPion (WELLBUTRIN XL) 150 MG extended release tablet               Plan:      1.  Patient with exacerbation/failed treatment of Lexapro.  We discussed anxiety and depression.  Plan to switch from Lexapro to Wellbutrin.  Discussed instructions and side effects.  Advised on hydroxyzine as needed for anxiety attacks.  Discussed importance of healthy sleep.  Continue with counseling.  He is also given referral to psychiatry for further evaluation and treatment.  He will contact us or seek evaluation if any acute changes.    Return in about 4 weeks (around 3/22/2024) for medication recheck.    No orders of the defined types were placed in this encounter.        Patient given educational materials - see patient instructions.  Discussed use, benefit, and side effects of prescribedmedications.  All patient questions answered. Pt voiced understanding. Reviewed health maintenance.  Instructed to continue current medications, diet and exercise.  Patient agreed with treatment plan. Follow up as directed.        Electronically signed by Yunior Belcher PA-C on 2/23/2024 at 1:54 PM

## 2024-03-21 DIAGNOSIS — F41.8 MIXED ANXIETY AND DEPRESSIVE DISORDER: ICD-10-CM

## 2024-03-21 RX ORDER — BUPROPION HYDROCHLORIDE 150 MG/1
150 TABLET ORAL EVERY MORNING
Qty: 30 TABLET | Refills: 0 | Status: SHIPPED | OUTPATIENT
Start: 2024-03-21 | End: 2024-03-22 | Stop reason: SDUPTHER

## 2024-03-21 NOTE — TELEPHONE ENCOUNTER
Ignacio Yuan is requesting a refill on the following medication(s):  Requested Prescriptions     Pending Prescriptions Disp Refills    buPROPion (WELLBUTRIN XL) 150 MG extended release tablet [Pharmacy Med Name: BUPROPION XL 150MG TABLETS (24 H)] 30 tablet 0     Sig: TAKE 1 TABLET BY MOUTH EVERY MORNING       Last Visit Date (If Applicable):  2/23/2024    Next Visit Date:    3/22/2024

## 2024-03-22 DIAGNOSIS — F41.8 MIXED ANXIETY AND DEPRESSIVE DISORDER: ICD-10-CM

## 2024-03-22 RX ORDER — BUPROPION HYDROCHLORIDE 150 MG/1
150 TABLET ORAL EVERY MORNING
Qty: 30 TABLET | Refills: 0 | Status: SHIPPED | OUTPATIENT
Start: 2024-03-22

## 2024-03-22 NOTE — TELEPHONE ENCOUNTER
Spoke with the patient and he states that the new medication is working well for him, he feels better than he has in a long time.    Patient asking for refill of current dose of the medication.     Last Visit Date: 2/23/2024   Next Visit Date: 5/16/2024

## 2024-03-24 DIAGNOSIS — F41.8 MIXED ANXIETY AND DEPRESSIVE DISORDER: ICD-10-CM

## 2024-03-25 RX ORDER — BUPROPION HYDROCHLORIDE 150 MG/1
150 TABLET ORAL EVERY MORNING
Qty: 30 TABLET | Refills: 0 | OUTPATIENT
Start: 2024-03-25

## 2024-04-17 DIAGNOSIS — F41.8 MIXED ANXIETY AND DEPRESSIVE DISORDER: ICD-10-CM

## 2024-04-18 RX ORDER — HYDROXYZINE 50 MG/1
50 TABLET, FILM COATED ORAL EVERY 8 HOURS PRN
Qty: 30 TABLET | Refills: 0 | Status: SHIPPED | OUTPATIENT
Start: 2024-04-18 | End: 2024-05-18

## 2024-04-22 DIAGNOSIS — F41.8 MIXED ANXIETY AND DEPRESSIVE DISORDER: ICD-10-CM

## 2024-04-22 RX ORDER — BUPROPION HYDROCHLORIDE 150 MG/1
150 TABLET ORAL EVERY MORNING
Qty: 30 TABLET | Refills: 2 | Status: SHIPPED | OUTPATIENT
Start: 2024-04-22

## 2024-04-22 NOTE — TELEPHONE ENCOUNTER
Ignacio Yuan is calling to request a refill on the following medication(s):    Medication Request:  Requested Prescriptions     Pending Prescriptions Disp Refills    buPROPion (WELLBUTRIN XL) 150 MG extended release tablet [Pharmacy Med Name: BUPROPION XL 150MG TABLETS (24 H)] 30 tablet 0     Sig: TAKE 1 TABLET BY MOUTH EVERY MORNING       Last Visit Date (If Applicable):  2/23/2024    Next Visit Date:    5/16/2024

## 2024-04-23 DIAGNOSIS — F41.8 MIXED ANXIETY AND DEPRESSIVE DISORDER: ICD-10-CM

## 2024-04-29 RX ORDER — BUPROPION HYDROCHLORIDE 150 MG/1
150 TABLET ORAL EVERY MORNING
Qty: 30 TABLET | Refills: 2 | OUTPATIENT
Start: 2024-04-29

## 2024-06-27 ENCOUNTER — PATIENT MESSAGE (OUTPATIENT)
Dept: PRIMARY CARE CLINIC | Age: 59
End: 2024-06-27

## 2024-06-27 ENCOUNTER — OFFICE VISIT (OUTPATIENT)
Dept: PRIMARY CARE CLINIC | Age: 59
End: 2024-06-27
Payer: COMMERCIAL

## 2024-06-27 VITALS
RESPIRATION RATE: 16 BRPM | OXYGEN SATURATION: 96 % | HEART RATE: 96 BPM | HEIGHT: 70 IN | WEIGHT: 186.8 LBS | BODY MASS INDEX: 26.74 KG/M2 | DIASTOLIC BLOOD PRESSURE: 70 MMHG | SYSTOLIC BLOOD PRESSURE: 118 MMHG

## 2024-06-27 DIAGNOSIS — Z12.5 SCREENING PSA (PROSTATE SPECIFIC ANTIGEN): ICD-10-CM

## 2024-06-27 DIAGNOSIS — Z13.220 ENCOUNTER FOR LIPID SCREENING FOR CARDIOVASCULAR DISEASE: ICD-10-CM

## 2024-06-27 DIAGNOSIS — Z13.6 ENCOUNTER FOR LIPID SCREENING FOR CARDIOVASCULAR DISEASE: ICD-10-CM

## 2024-06-27 DIAGNOSIS — F41.8 MIXED ANXIETY AND DEPRESSIVE DISORDER: ICD-10-CM

## 2024-06-27 DIAGNOSIS — I10 PRIMARY HYPERTENSION: Primary | ICD-10-CM

## 2024-06-27 PROCEDURE — 3074F SYST BP LT 130 MM HG: CPT | Performed by: PHYSICIAN ASSISTANT

## 2024-06-27 PROCEDURE — 99214 OFFICE O/P EST MOD 30 MIN: CPT | Performed by: PHYSICIAN ASSISTANT

## 2024-06-27 PROCEDURE — 3078F DIAST BP <80 MM HG: CPT | Performed by: PHYSICIAN ASSISTANT

## 2024-06-27 RX ORDER — LISINOPRIL 10 MG/1
10 TABLET ORAL DAILY
COMMUNITY
Start: 2024-06-24 | End: 2024-06-27 | Stop reason: DRUGHIGH

## 2024-06-27 RX ORDER — LOSARTAN POTASSIUM 25 MG/1
25 TABLET ORAL DAILY
Qty: 30 TABLET | Refills: 0 | Status: SHIPPED | OUTPATIENT
Start: 2024-06-27

## 2024-06-27 SDOH — ECONOMIC STABILITY: INCOME INSECURITY: HOW HARD IS IT FOR YOU TO PAY FOR THE VERY BASICS LIKE FOOD, HOUSING, MEDICAL CARE, AND HEATING?: NOT HARD AT ALL

## 2024-06-27 SDOH — ECONOMIC STABILITY: FOOD INSECURITY: WITHIN THE PAST 12 MONTHS, YOU WORRIED THAT YOUR FOOD WOULD RUN OUT BEFORE YOU GOT MONEY TO BUY MORE.: NEVER TRUE

## 2024-06-27 SDOH — ECONOMIC STABILITY: FOOD INSECURITY: WITHIN THE PAST 12 MONTHS, THE FOOD YOU BOUGHT JUST DIDN'T LAST AND YOU DIDN'T HAVE MONEY TO GET MORE.: NEVER TRUE

## 2024-06-27 ASSESSMENT — PATIENT HEALTH QUESTIONNAIRE - PHQ9
SUM OF ALL RESPONSES TO PHQ QUESTIONS 1-9: 0
SUM OF ALL RESPONSES TO PHQ9 QUESTIONS 1 & 2: 0
3. TROUBLE FALLING OR STAYING ASLEEP: NOT AT ALL
2. FEELING DOWN, DEPRESSED OR HOPELESS: NOT AT ALL
7. TROUBLE CONCENTRATING ON THINGS, SUCH AS READING THE NEWSPAPER OR WATCHING TELEVISION: NOT AT ALL
SUM OF ALL RESPONSES TO PHQ QUESTIONS 1-9: 0
4. FEELING TIRED OR HAVING LITTLE ENERGY: NOT AT ALL
SUM OF ALL RESPONSES TO PHQ QUESTIONS 1-9: 0
SUM OF ALL RESPONSES TO PHQ QUESTIONS 1-9: 0
10. IF YOU CHECKED OFF ANY PROBLEMS, HOW DIFFICULT HAVE THESE PROBLEMS MADE IT FOR YOU TO DO YOUR WORK, TAKE CARE OF THINGS AT HOME, OR GET ALONG WITH OTHER PEOPLE: NOT DIFFICULT AT ALL
6. FEELING BAD ABOUT YOURSELF - OR THAT YOU ARE A FAILURE OR HAVE LET YOURSELF OR YOUR FAMILY DOWN: NOT AT ALL
9. THOUGHTS THAT YOU WOULD BE BETTER OFF DEAD, OR OF HURTING YOURSELF: NOT AT ALL
8. MOVING OR SPEAKING SO SLOWLY THAT OTHER PEOPLE COULD HAVE NOTICED. OR THE OPPOSITE, BEING SO FIGETY OR RESTLESS THAT YOU HAVE BEEN MOVING AROUND A LOT MORE THAN USUAL: NOT AT ALL
1. LITTLE INTEREST OR PLEASURE IN DOING THINGS: NOT AT ALL
5. POOR APPETITE OR OVEREATING: NOT AT ALL

## 2024-06-27 ASSESSMENT — ENCOUNTER SYMPTOMS
EYE PAIN: 0
RHINORRHEA: 0
VOMITING: 0
CONSTIPATION: 0
DIARRHEA: 1
COUGH: 0
BACK PAIN: 0
SHORTNESS OF BREATH: 0
SINUS PAIN: 0
ABDOMINAL PAIN: 0
NAUSEA: 0

## 2024-06-27 NOTE — PROGRESS NOTES
Follow up as directed.        Electronically signed by Yunior Belcher PA-C on 6/27/2024 at 2:43 PM

## 2024-06-27 NOTE — TELEPHONE ENCOUNTER
From: Ignacio Yuan  To: Yunior Belcher  Sent: 6/27/2024 7:34 PM EDT  Subject: Script    I forgot to ask I'm out of refills on hydroxyzine.  Would you send a refill to [facility] in [place]?  Thanks

## 2024-06-28 DIAGNOSIS — F41.8 MIXED ANXIETY AND DEPRESSIVE DISORDER: ICD-10-CM

## 2024-06-28 RX ORDER — HYDROXYZINE 50 MG/1
50 TABLET, FILM COATED ORAL EVERY 8 HOURS PRN
Qty: 30 TABLET | Refills: 0 | Status: SHIPPED | OUTPATIENT
Start: 2024-06-28 | End: 2024-07-28

## 2024-06-28 RX ORDER — HYDROXYZINE HYDROCHLORIDE 50 MG/1
50 TABLET, FILM COATED ORAL EVERY 8 HOURS PRN
Qty: 30 TABLET | Refills: 0 | OUTPATIENT
Start: 2024-06-28 | End: 2024-07-28

## 2024-07-23 DIAGNOSIS — I10 PRIMARY HYPERTENSION: ICD-10-CM

## 2024-07-23 RX ORDER — LOSARTAN POTASSIUM 25 MG/1
25 TABLET ORAL DAILY
Qty: 30 TABLET | Refills: 0 | Status: SHIPPED | OUTPATIENT
Start: 2024-07-23

## 2024-08-25 DIAGNOSIS — I10 PRIMARY HYPERTENSION: ICD-10-CM

## 2024-08-26 RX ORDER — LOSARTAN POTASSIUM 25 MG/1
25 TABLET ORAL DAILY
Qty: 30 TABLET | Refills: 0 | Status: SHIPPED | OUTPATIENT
Start: 2024-08-26

## 2024-08-28 DIAGNOSIS — F41.8 MIXED ANXIETY AND DEPRESSIVE DISORDER: ICD-10-CM

## 2024-08-28 RX ORDER — BUPROPION HYDROCHLORIDE 150 MG/1
150 TABLET ORAL EVERY MORNING
Qty: 30 TABLET | Refills: 2 | Status: SHIPPED | OUTPATIENT
Start: 2024-08-28

## 2024-08-28 NOTE — TELEPHONE ENCOUNTER
Last OV:  6/27/2024    Next OV: 9/27/2024    Pharmacy:     [id number] - [place], [place] - [address] -  [phone] - F [phone]  [address]  [place] [address]  Phone: [phone] Fax: [phone]       Confirmed? Yes

## 2024-09-07 DIAGNOSIS — I10 PRIMARY HYPERTENSION: ICD-10-CM

## 2024-09-09 ENCOUNTER — PATIENT MESSAGE (OUTPATIENT)
Dept: PRIMARY CARE CLINIC | Age: 59
End: 2024-09-09

## 2024-09-09 DIAGNOSIS — I10 PRIMARY HYPERTENSION: ICD-10-CM

## 2024-09-09 RX ORDER — LOSARTAN POTASSIUM 25 MG/1
25 TABLET ORAL DAILY
Qty: 30 TABLET | Refills: 2 | Status: SHIPPED | OUTPATIENT
Start: 2024-09-09 | End: 2024-09-10 | Stop reason: SDUPTHER

## 2024-09-10 RX ORDER — LOSARTAN POTASSIUM 25 MG/1
25 TABLET ORAL DAILY
Qty: 90 TABLET | Refills: 3 | Status: SHIPPED | OUTPATIENT
Start: 2024-09-10

## 2024-09-25 DIAGNOSIS — I10 PRIMARY HYPERTENSION: ICD-10-CM

## 2024-09-25 RX ORDER — LOSARTAN POTASSIUM 25 MG/1
25 TABLET ORAL DAILY
Qty: 30 TABLET | Refills: 2 | Status: SHIPPED | OUTPATIENT
Start: 2024-09-25 | End: 2024-09-27 | Stop reason: SINTOL

## 2024-09-27 ENCOUNTER — OFFICE VISIT (OUTPATIENT)
Dept: PRIMARY CARE CLINIC | Age: 59
End: 2024-09-27
Payer: COMMERCIAL

## 2024-09-27 VITALS
OXYGEN SATURATION: 98 % | DIASTOLIC BLOOD PRESSURE: 88 MMHG | BODY MASS INDEX: 26.63 KG/M2 | WEIGHT: 186 LBS | HEIGHT: 70 IN | SYSTOLIC BLOOD PRESSURE: 124 MMHG | HEART RATE: 71 BPM

## 2024-09-27 DIAGNOSIS — F41.8 MIXED ANXIETY AND DEPRESSIVE DISORDER: Primary | ICD-10-CM

## 2024-09-27 DIAGNOSIS — I10 PRIMARY HYPERTENSION: ICD-10-CM

## 2024-09-27 PROCEDURE — 99214 OFFICE O/P EST MOD 30 MIN: CPT | Performed by: PHYSICIAN ASSISTANT

## 2024-09-27 PROCEDURE — 3074F SYST BP LT 130 MM HG: CPT | Performed by: PHYSICIAN ASSISTANT

## 2024-09-27 PROCEDURE — 3079F DIAST BP 80-89 MM HG: CPT | Performed by: PHYSICIAN ASSISTANT

## 2024-09-27 RX ORDER — AMLODIPINE BESYLATE 5 MG/1
5 TABLET ORAL DAILY
Qty: 30 TABLET | Refills: 2 | Status: SHIPPED | OUTPATIENT
Start: 2024-09-27

## 2024-09-27 NOTE — PROGRESS NOTES
constipation, diarrhea, nausea and vomiting.   Genitourinary:  Negative for difficulty urinating, enuresis and testicular pain.   Musculoskeletal:  Negative for arthralgias, back pain and myalgias.   Skin:  Negative for rash.   Neurological:  Negative for dizziness and headaches.   Psychiatric/Behavioral:  Negative for confusion and sleep disturbance. The patient is not nervous/anxious.    All other systems reviewed and are negative.      Objective:     Physical Exam  Vitals and nursing note reviewed.   Constitutional:       General: He is not in acute distress.     Appearance: Normal appearance.   HENT:      Head: Normocephalic.      Mouth/Throat:      Mouth: Mucous membranes are moist.   Eyes:      Extraocular Movements: Extraocular movements intact.      Conjunctiva/sclera: Conjunctivae normal.      Pupils: Pupils are equal, round, and reactive to light.   Cardiovascular:      Rate and Rhythm: Normal rate and regular rhythm.      Pulses: Normal pulses.      Heart sounds: Normal heart sounds.   Pulmonary:      Effort: Pulmonary effort is normal.      Breath sounds: Normal breath sounds.   Abdominal:      General: Abdomen is flat. Bowel sounds are normal.      Palpations: Abdomen is soft.      Tenderness: There is no abdominal tenderness.   Musculoskeletal:      Cervical back: Normal range of motion.      Right lower leg: No edema.      Left lower leg: No edema.   Lymphadenopathy:      Cervical: No cervical adenopathy.   Skin:     General: Skin is warm.      Capillary Refill: Capillary refill takes less than 2 seconds.   Neurological:      General: No focal deficit present.      Mental Status: He is alert and oriented to person, place, and time.   Psychiatric:         Mood and Affect: Mood normal.         Behavior: Behavior normal.       /88   Pulse 71   Ht 1.778 m (5' 10\")   Wt 84.4 kg (186 lb)   SpO2 98%   BMI 26.69 kg/m²     Assessment:       ICD-10-CM    1. Mixed anxiety and depressive disorder

## 2024-09-28 LAB
ALBUMIN: 4.5 G/DL
ALP BLD-CCNC: 51 U/L
ALT SERPL-CCNC: 31 U/L
AST SERPL-CCNC: 31 U/L
BASOPHILS ABSOLUTE: 0.08 /ΜL
BASOPHILS RELATIVE PERCENT: 1.2 %
BILIRUB SERPL-MCNC: 0.5 MG/DL (ref 0.1–1.4)
BUN BLDV-MCNC: 23 MG/DL
CALCIUM SERPL-MCNC: 9.8 MG/DL
CHLORIDE BLD-SCNC: 106 MMOL/L
CHOLESTEROL, TOTAL: 232 MG/DL
CHOLESTEROL/HDL RATIO: 5.4
CO2: 26 MMOL/L
CREAT SERPL-MCNC: 1.1 MG/DL
EOSINOPHILS ABSOLUTE: 0.35 /ΜL
EOSINOPHILS RELATIVE PERCENT: 5.2 %
GLUCOSE FASTING: 113 MG/DL
HCT VFR BLD CALC: 43.5 % (ref 41–53)
HDLC SERPL-MCNC: 43 MG/DL (ref 35–70)
HEMOGLOBIN: 14.8 G/DL (ref 13.5–17.5)
LDL CHOLESTEROL: 153
LYMPHOCYTES ABSOLUTE: 2.22 /ΜL
LYMPHOCYTES RELATIVE PERCENT: 32.9 %
MCH RBC QN AUTO: 32.4 PG
MCHC RBC AUTO-ENTMCNC: 34 G/DL
MCV RBC AUTO: 95.2 FL
MONOCYTES ABSOLUTE: 0.51 /ΜL
MONOCYTES RELATIVE PERCENT: 7.6 %
NEUTROPHILS ABSOLUTE: 3.57 /ΜL
NEUTROPHILS RELATIVE PERCENT: 52.8 %
NONHDLC SERPL-MCNC: NORMAL MG/DL
PLATELET # BLD: 242 K/ΜL
PMV BLD AUTO: 9.6 FL
POTASSIUM SERPL-SCNC: 4.7 MMOL/L
PROSTATE SPECIFIC ANTIGEN: 0.9 NG/ML
RBC # BLD: 4.57 10^6/ΜL
SODIUM BLD-SCNC: 140 MMOL/L
TOTAL PROTEIN: 8.3 G/DL (ref 6.4–8.2)
TRIGL SERPL-MCNC: 182 MG/DL
TSH SERPL DL<=0.05 MIU/L-ACNC: 1.53 UIU/ML
VLDLC SERPL CALC-MCNC: 36 MG/DL
WBC # BLD: 6.8 10^3/ML

## 2024-09-30 ASSESSMENT — ENCOUNTER SYMPTOMS
RHINORRHEA: 0
EYE PAIN: 0
BACK PAIN: 0
VOMITING: 0
DIARRHEA: 0
SHORTNESS OF BREATH: 0
COUGH: 0
ABDOMINAL PAIN: 0
CONSTIPATION: 0
SINUS PAIN: 0
NAUSEA: 0

## 2024-10-07 DIAGNOSIS — Z13.6 ENCOUNTER FOR LIPID SCREENING FOR CARDIOVASCULAR DISEASE: ICD-10-CM

## 2024-10-07 DIAGNOSIS — I10 PRIMARY HYPERTENSION: ICD-10-CM

## 2024-10-07 DIAGNOSIS — Z13.220 ENCOUNTER FOR LIPID SCREENING FOR CARDIOVASCULAR DISEASE: ICD-10-CM

## 2024-10-07 DIAGNOSIS — Z12.5 SCREENING PSA (PROSTATE SPECIFIC ANTIGEN): ICD-10-CM

## 2024-10-23 ENCOUNTER — PATIENT MESSAGE (OUTPATIENT)
Dept: PRIMARY CARE CLINIC | Age: 59
End: 2024-10-23

## 2024-10-23 DIAGNOSIS — I10 PRIMARY HYPERTENSION: ICD-10-CM

## 2024-10-23 DIAGNOSIS — F41.8 MIXED ANXIETY AND DEPRESSIVE DISORDER: ICD-10-CM

## 2024-10-24 RX ORDER — HYDROXYZINE HYDROCHLORIDE 50 MG/1
50 TABLET, FILM COATED ORAL EVERY 8 HOURS PRN
Qty: 30 TABLET | Refills: 0 | Status: SHIPPED | OUTPATIENT
Start: 2024-10-24 | End: 2024-11-23

## 2024-10-24 RX ORDER — BUPROPION HYDROCHLORIDE 150 MG/1
150 TABLET ORAL EVERY MORNING
Qty: 30 TABLET | Refills: 2 | Status: SHIPPED | OUTPATIENT
Start: 2024-10-24

## 2024-10-24 RX ORDER — AMLODIPINE BESYLATE 5 MG/1
5 TABLET ORAL DAILY
Qty: 30 TABLET | Refills: 2 | Status: SHIPPED | OUTPATIENT
Start: 2024-10-24

## 2024-10-24 NOTE — TELEPHONE ENCOUNTER
LAST VISIT:   9/27/2024     Future Appointments   Date Time Provider Department Center   3/28/2025  3:30 PM Yunior Belcher PA-C [place] [facility] DEP

## 2025-02-10 DIAGNOSIS — F41.8 MIXED ANXIETY AND DEPRESSIVE DISORDER: ICD-10-CM

## 2025-02-10 NOTE — TELEPHONE ENCOUNTER
Ignacio Yuan is requesting a refill on the following medication(s):  Requested Prescriptions     Pending Prescriptions Disp Refills    hydrOXYzine HCl (ATARAX) 50 MG tablet 30 tablet 0     Sig: Take 1 tablet by mouth every 8 hours as needed for Anxiety       Last Visit Date (If Applicable):  9/27/2024    Next Visit Date:    3/28/2025

## 2025-02-10 NOTE — TELEPHONE ENCOUNTER
Can I get a refill on the Hydroxyz ? I still don’t need these very often, but they do help if I have really heavy days? If you approve, would you send to [facility] in [place], please? Thanks Yunior.       Ignacio Yuan is requesting a refill on the following medication(s):  Requested Prescriptions     Pending Prescriptions Disp Refills    hydrOXYzine HCl (ATARAX) 50 MG tablet 30 tablet 0     Sig: Take 1 tablet by mouth every 8 hours as needed for Anxiety       Last Visit Date (If Applicable):  9/27/2024    Next Visit Date:    3/28/2025

## 2025-02-11 RX ORDER — HYDROXYZINE HYDROCHLORIDE 50 MG/1
50 TABLET, FILM COATED ORAL EVERY 8 HOURS PRN
Qty: 30 TABLET | Refills: 0 | Status: SHIPPED | OUTPATIENT
Start: 2025-02-11 | End: 2025-03-13

## 2025-03-13 DIAGNOSIS — F41.8 MIXED ANXIETY AND DEPRESSIVE DISORDER: ICD-10-CM

## 2025-03-13 RX ORDER — HYDROXYZINE HYDROCHLORIDE 50 MG/1
50 TABLET, FILM COATED ORAL EVERY 8 HOURS PRN
Qty: 30 TABLET | Refills: 0 | Status: SHIPPED | OUTPATIENT
Start: 2025-03-13 | End: 2025-04-12

## 2025-03-19 DIAGNOSIS — F41.8 MIXED ANXIETY AND DEPRESSIVE DISORDER: ICD-10-CM

## 2025-03-21 RX ORDER — BUPROPION HYDROCHLORIDE 150 MG/1
150 TABLET ORAL EVERY MORNING
Qty: 30 TABLET | Refills: 2 | Status: SHIPPED | OUTPATIENT
Start: 2025-03-21

## 2025-03-21 NOTE — TELEPHONE ENCOUNTER
Ignacio Yuan is requesting a refill on the following medication(s):  Requested Prescriptions     Pending Prescriptions Disp Refills    buPROPion (WELLBUTRIN XL) 150 MG extended release tablet 30 tablet 2     Sig: Take 1 tablet by mouth every morning       Last Visit Date (If Applicable):  9/27/2024    Next Visit Date:    Visit date not found

## 2025-04-17 ENCOUNTER — PATIENT MESSAGE (OUTPATIENT)
Dept: PRIMARY CARE CLINIC | Age: 60
End: 2025-04-17

## 2025-04-17 DIAGNOSIS — F41.8 MIXED ANXIETY AND DEPRESSIVE DISORDER: ICD-10-CM

## 2025-04-17 DIAGNOSIS — I10 PRIMARY HYPERTENSION: ICD-10-CM

## 2025-04-17 RX ORDER — BUPROPION HYDROCHLORIDE 150 MG/1
150 TABLET ORAL EVERY MORNING
Qty: 30 TABLET | Refills: 2 | Status: SHIPPED | OUTPATIENT
Start: 2025-04-17

## 2025-04-17 RX ORDER — AMLODIPINE BESYLATE 5 MG/1
5 TABLET ORAL DAILY
Qty: 30 TABLET | Refills: 2 | Status: SHIPPED | OUTPATIENT
Start: 2025-04-17

## 2025-04-17 RX ORDER — HYDROXYZINE HYDROCHLORIDE 50 MG/1
50 TABLET, FILM COATED ORAL EVERY 8 HOURS PRN
Qty: 30 TABLET | Refills: 0 | Status: SHIPPED | OUTPATIENT
Start: 2025-04-17 | End: 2025-05-17

## 2025-04-17 NOTE — TELEPHONE ENCOUNTER
Last Visit Date: 9/27/2024   Next Visit Date: Visit date not found     Scheduling ticket sent to pt for f/u.

## 2025-05-07 ENCOUNTER — TELEMEDICINE (OUTPATIENT)
Dept: PRIMARY CARE CLINIC | Age: 60
End: 2025-05-07
Payer: COMMERCIAL

## 2025-05-07 DIAGNOSIS — F41.8 MIXED ANXIETY AND DEPRESSIVE DISORDER: ICD-10-CM

## 2025-05-07 DIAGNOSIS — J01.00 ACUTE NON-RECURRENT MAXILLARY SINUSITIS: ICD-10-CM

## 2025-05-07 DIAGNOSIS — I10 PRIMARY HYPERTENSION: Primary | ICD-10-CM

## 2025-05-07 PROCEDURE — 99214 OFFICE O/P EST MOD 30 MIN: CPT | Performed by: PHYSICIAN ASSISTANT

## 2025-05-07 RX ORDER — HYDROXYZINE HYDROCHLORIDE 50 MG/1
50 TABLET, FILM COATED ORAL EVERY 8 HOURS PRN
Qty: 30 TABLET | Refills: 2 | Status: SHIPPED | OUTPATIENT
Start: 2025-05-07 | End: 2025-08-05

## 2025-05-07 RX ORDER — AMOXICILLIN 875 MG/1
875 TABLET, COATED ORAL 2 TIMES DAILY
Qty: 20 TABLET | Refills: 0 | Status: SHIPPED | OUTPATIENT
Start: 2025-05-07 | End: 2025-05-17

## 2025-05-07 SDOH — ECONOMIC STABILITY: FOOD INSECURITY: WITHIN THE PAST 12 MONTHS, THE FOOD YOU BOUGHT JUST DIDN'T LAST AND YOU DIDN'T HAVE MONEY TO GET MORE.: NEVER TRUE

## 2025-05-07 SDOH — ECONOMIC STABILITY: FOOD INSECURITY: WITHIN THE PAST 12 MONTHS, YOU WORRIED THAT YOUR FOOD WOULD RUN OUT BEFORE YOU GOT MONEY TO BUY MORE.: NEVER TRUE

## 2025-05-07 ASSESSMENT — ENCOUNTER SYMPTOMS
SINUS PRESSURE: 1
SINUS PAIN: 0
SHORTNESS OF BREATH: 0
RHINORRHEA: 0
SORE THROAT: 0
CONSTIPATION: 0
NAUSEA: 0
DIARRHEA: 0
BACK PAIN: 0
EYE PAIN: 0
ABDOMINAL PAIN: 0
VOMITING: 0
COUGH: 1

## 2025-05-07 ASSESSMENT — PATIENT HEALTH QUESTIONNAIRE - PHQ9
7. TROUBLE CONCENTRATING ON THINGS, SUCH AS READING THE NEWSPAPER OR WATCHING TELEVISION: NOT AT ALL
1. LITTLE INTEREST OR PLEASURE IN DOING THINGS: NOT AT ALL
3. TROUBLE FALLING OR STAYING ASLEEP: NOT AT ALL
6. FEELING BAD ABOUT YOURSELF - OR THAT YOU ARE A FAILURE OR HAVE LET YOURSELF OR YOUR FAMILY DOWN: NOT AT ALL
2. FEELING DOWN, DEPRESSED OR HOPELESS: NOT AT ALL
SUM OF ALL RESPONSES TO PHQ QUESTIONS 1-9: 0
4. FEELING TIRED OR HAVING LITTLE ENERGY: NOT AT ALL
5. POOR APPETITE OR OVEREATING: NOT AT ALL
SUM OF ALL RESPONSES TO PHQ QUESTIONS 1-9: 0
8. MOVING OR SPEAKING SO SLOWLY THAT OTHER PEOPLE COULD HAVE NOTICED. OR THE OPPOSITE, BEING SO FIGETY OR RESTLESS THAT YOU HAVE BEEN MOVING AROUND A LOT MORE THAN USUAL: NOT AT ALL
SUM OF ALL RESPONSES TO PHQ QUESTIONS 1-9: 0
10. IF YOU CHECKED OFF ANY PROBLEMS, HOW DIFFICULT HAVE THESE PROBLEMS MADE IT FOR YOU TO DO YOUR WORK, TAKE CARE OF THINGS AT HOME, OR GET ALONG WITH OTHER PEOPLE: NOT DIFFICULT AT ALL
9. THOUGHTS THAT YOU WOULD BE BETTER OFF DEAD, OR OF HURTING YOURSELF: NOT AT ALL
SUM OF ALL RESPONSES TO PHQ QUESTIONS 1-9: 0

## 2025-05-07 NOTE — PROGRESS NOTES
2025    TELEHEALTH EVALUATION -- Audio/Visual    HPI:    Ignacio Yuan (:  1965) has requested an audio/video evaluation for the following concern(s):    Presents as a virtual visit for routine follow-up.  Past medical history including hypertension, anxiety/depression.  We reviewed chronic medications.  He is compliant with amlodipine 5 mg daily.  Blood pressures at home are 130-140/85-90.  He reports anxiety and depression is very well-controlled.  Mood is stable.  He has good interest in activities and relationships.  Denies any exacerbation.    Primary concern at this time is sinus infection.  For the last 1 to 2 weeks he has been dealing with postnasal drip, sinus pressure, sinus headache, cough, congestion.  Initially was having some fevers and chills, but these resolved.  Mucus production has changed from clear to white to green/yellow.  Cough is described as wet and productive.  No shortness of breath or wheezing.  He does have history of recurrent sinus infections.  No known allergies.    No other acute changes.    Review of Systems   Constitutional:  Negative for chills, fatigue and fever.   HENT:  Positive for congestion, postnasal drip and sinus pressure. Negative for rhinorrhea, sinus pain and sore throat.    Eyes:  Negative for pain.   Respiratory:  Positive for cough. Negative for shortness of breath.    Cardiovascular:  Negative for chest pain and leg swelling.   Gastrointestinal:  Negative for abdominal pain, constipation, diarrhea, nausea and vomiting.   Genitourinary:  Negative for difficulty urinating, enuresis and testicular pain.   Musculoskeletal:  Negative for arthralgias, back pain and myalgias.   Skin:  Negative for rash.   Neurological:  Positive for headaches. Negative for dizziness.   Psychiatric/Behavioral:  Negative for confusion and sleep disturbance. The patient is not nervous/anxious.    All other systems reviewed and are negative.      Prior to Visit Medications

## 2025-06-14 DIAGNOSIS — I10 PRIMARY HYPERTENSION: ICD-10-CM

## 2025-06-14 DIAGNOSIS — F41.8 MIXED ANXIETY AND DEPRESSIVE DISORDER: ICD-10-CM

## 2025-06-17 RX ORDER — BUPROPION HYDROCHLORIDE 150 MG/1
150 TABLET ORAL EVERY MORNING
Qty: 30 TABLET | Refills: 2 | Status: SHIPPED | OUTPATIENT
Start: 2025-06-17

## 2025-06-17 RX ORDER — HYDROXYZINE HYDROCHLORIDE 50 MG/1
50 TABLET, FILM COATED ORAL EVERY 8 HOURS PRN
Qty: 90 TABLET | Refills: 2 | Status: SHIPPED | OUTPATIENT
Start: 2025-06-17 | End: 2025-09-15

## 2025-06-17 RX ORDER — AMLODIPINE BESYLATE 5 MG/1
5 TABLET ORAL DAILY
Qty: 30 TABLET | Refills: 2 | Status: SHIPPED | OUTPATIENT
Start: 2025-06-17

## 2025-08-04 DIAGNOSIS — I10 PRIMARY HYPERTENSION: ICD-10-CM

## 2025-08-04 DIAGNOSIS — F41.8 MIXED ANXIETY AND DEPRESSIVE DISORDER: ICD-10-CM

## 2025-08-05 RX ORDER — AMLODIPINE BESYLATE 5 MG/1
5 TABLET ORAL DAILY
Qty: 30 TABLET | Refills: 2 | Status: SHIPPED | OUTPATIENT
Start: 2025-08-05

## 2025-08-05 RX ORDER — HYDROXYZINE HYDROCHLORIDE 50 MG/1
50 TABLET, FILM COATED ORAL EVERY 8 HOURS PRN
Qty: 90 TABLET | Refills: 2 | Status: SHIPPED | OUTPATIENT
Start: 2025-08-05 | End: 2025-11-03

## 2025-08-05 RX ORDER — BUPROPION HYDROCHLORIDE 150 MG/1
150 TABLET ORAL EVERY MORNING
Qty: 30 TABLET | Refills: 2 | Status: SHIPPED | OUTPATIENT
Start: 2025-08-05

## (undated) DEVICE — SNARE ENDOSCP L240CM SHTH DIA2.4MM LOOP W20MM MIN WRK CHN

## (undated) DEVICE — WORKING LENGTH 155CM, WORKING CHANNEL 2.8MM: Brand: RESOLUTION 360 CLIP

## (undated) DEVICE — PERRYSBURG ENDO PACK: Brand: MEDLINE INDUSTRIES, INC.

## (undated) DEVICE — ERBE NESSY®PLATE 170 SPLIT; 168CM²; CABLE 3M: Brand: ERBE

## (undated) DEVICE — ADAPTER CLEANING PORPOISE CLEANING

## (undated) DEVICE — Device: Brand: DEFENDO VALVE AND CONNECTOR KIT

## (undated) DEVICE — POLYP TRAP: Brand: TRAPEASE®

## (undated) DEVICE — GLOVE ORANGE PI 7   MSG9070